# Patient Record
Sex: FEMALE | Race: WHITE
[De-identification: names, ages, dates, MRNs, and addresses within clinical notes are randomized per-mention and may not be internally consistent; named-entity substitution may affect disease eponyms.]

---

## 2017-12-20 ENCOUNTER — HOSPITAL ENCOUNTER (OUTPATIENT)
Dept: HOSPITAL 61 - PCVCIMAG | Age: 82
Discharge: HOME | End: 2017-12-20
Attending: INTERNAL MEDICINE
Payer: MEDICARE

## 2017-12-20 DIAGNOSIS — I08.3: Primary | ICD-10-CM

## 2017-12-20 DIAGNOSIS — I31.3: ICD-10-CM

## 2017-12-20 DIAGNOSIS — Z95.1: ICD-10-CM

## 2017-12-20 DIAGNOSIS — I48.91: ICD-10-CM

## 2017-12-20 DIAGNOSIS — I10: ICD-10-CM

## 2017-12-20 PROCEDURE — 93306 TTE W/DOPPLER COMPLETE: CPT

## 2018-03-19 ENCOUNTER — HOSPITAL ENCOUNTER (OUTPATIENT)
Dept: HOSPITAL 61 - PCVCCLINIC | Age: 83
Discharge: HOME | End: 2018-03-19
Attending: INTERNAL MEDICINE
Payer: MEDICARE

## 2018-03-19 DIAGNOSIS — Z95.0: ICD-10-CM

## 2018-03-19 DIAGNOSIS — E78.5: ICD-10-CM

## 2018-03-19 DIAGNOSIS — I10: Primary | ICD-10-CM

## 2018-03-19 DIAGNOSIS — I48.0: ICD-10-CM

## 2018-03-19 DIAGNOSIS — Z79.899: ICD-10-CM

## 2018-03-19 PROCEDURE — 80061 LIPID PANEL: CPT

## 2018-03-19 PROCEDURE — 93280 PM DEVICE PROGR EVAL DUAL: CPT

## 2018-09-17 ENCOUNTER — HOSPITAL ENCOUNTER (OUTPATIENT)
Dept: HOSPITAL 61 - PCVCCLINIC | Age: 83
Discharge: HOME | End: 2018-09-17
Attending: INTERNAL MEDICINE
Payer: MEDICARE

## 2018-09-17 DIAGNOSIS — K21.9: ICD-10-CM

## 2018-09-17 DIAGNOSIS — Z92.0: ICD-10-CM

## 2018-09-17 DIAGNOSIS — E78.5: ICD-10-CM

## 2018-09-17 DIAGNOSIS — R07.9: ICD-10-CM

## 2018-09-17 DIAGNOSIS — Z95.0: ICD-10-CM

## 2018-09-17 DIAGNOSIS — I10: Primary | ICD-10-CM

## 2018-09-17 DIAGNOSIS — I48.0: ICD-10-CM

## 2018-09-17 DIAGNOSIS — E78.5: Primary | ICD-10-CM

## 2018-09-17 DIAGNOSIS — Z79.899: ICD-10-CM

## 2018-09-17 PROCEDURE — 93279 PRGRMG DEV EVAL PM/LDLS PM: CPT

## 2018-09-17 PROCEDURE — 80061 LIPID PANEL: CPT

## 2018-09-17 PROCEDURE — G0463 HOSPITAL OUTPT CLINIC VISIT: HCPCS

## 2018-09-17 PROCEDURE — 93005 ELECTROCARDIOGRAM TRACING: CPT

## 2018-09-27 ENCOUNTER — HOSPITAL ENCOUNTER (OUTPATIENT)
Dept: HOSPITAL 61 - PCVCIMAG | Age: 83
Discharge: HOME | End: 2018-09-27
Attending: INTERNAL MEDICINE
Payer: MEDICARE

## 2018-09-27 DIAGNOSIS — I48.0: Primary | ICD-10-CM

## 2018-09-27 DIAGNOSIS — R07.9: ICD-10-CM

## 2018-09-27 PROCEDURE — A9500 TC99M SESTAMIBI: HCPCS

## 2018-09-27 PROCEDURE — 93017 CV STRESS TEST TRACING ONLY: CPT

## 2018-09-27 PROCEDURE — 78452 HT MUSCLE IMAGE SPECT MULT: CPT

## 2018-10-02 NOTE — PCVCIMAG
--------------- APPROVED REPORT --------------





Imaging Protocol: Rest Tc-99m/Stress Tc-99m 1 day

Study performed:  09/27/2018 10:16:18



Indication: Chest pain, Paroxysmal Atrial Fibrillation

Patient Location: Out-Patient

Stress Nurse: Estella Russell RN

NM Tech:BROOK MenardMT



Ht: 5 ft 0 in Wt: 125 lbs BSA:  1.53 m2

HR: 90 bpm                      BP: 178/84 mmHg         BMI:  

24.4



Medical History

Medications: Xanax, Carvedilol, Losartan, Pantoprazole, Simvastatin, 

Xarelto

Allergies: No known drug allergies

Cardiac Risk Factors: Age, HTN, Hyperlipidemia, Afib, 

Pacemaker

Previous Cardiac Procedures: PPM

Pretest Chest Pain Characteristics: No chest pain

Exercise History: Physically active

Meds Held (24 hrs): Carvedilol



Resting Data

Rest SPECT myocardial perfusion imaging was performed in supine 

position 45 minutes following the intravenous injection of 11.0 mCi 

of Tc-99m Sestamibi.

Time of rest injection: 0930     Date: 09/27/2018

Administration Route: IV

Administration Site: Right AC



Pharmacologic Stress

Pharmacologic stress test was performed by injecting Regadenoson 0.4 

mg IV push over 10-15 seconds immediately followed by the intravenous 

injection of 33.2 mCi of Tc-99m Sestamibi.

Time of stress injection: 1050     Date: 09/27/2018

Administration Route: IV

Administration Site: Right AC

Gated Stress SPECT was performed 45 minutes after stress 

injection.

The images were gated to evaluate regional wall motion and calculate 

left ventricular ejection fraction. 



Stress Test Details

Stress Test:  Pharmacologic stress testing performed using 0.4 mg of 

regadenoson per 5 mL given IV over 10 seconds.

  Reason for pharmacologic stress test: physical limitation.



HRMax Heart Rate (APMHR): 131 bpm 

Resting HR:            90 bpmTarget HR (85% APMHR): 111 bpm

Max HR Achieved:  80 bpm

% of APMHR:         61

Recovery HR:            82 bpm



BP

Resting BP:  178/84 mmHg



Recovery BP:       172/83 mmHg

ECG

Resting ECG:  Sinus Rhythm, 1st degree AV block

Stress ECG:     Sinus Rhythm, 1st degree AV block

Arrhythmia:    None

Recovery ECG: Sinus Rhythm, 1st degree AV 

block



Clinical

Reason for Termination: Completed protocol

Stress Symptoms: Mild Dyspnea

Exercise duration: 0 min 55 sec

Symptoms resolved during recovery.



Stress ECG Conclusion

ECG: Non-ischemic



Study Quality

Study: Good



Study Data

Post stress, the left ventricular ejection was 74%..

SSS: 0

SRS: 0

SDS: 0

TID = 0.63.



Perfusion

No evidence of stress induced ischemia or prior myocardial 

infarction.



Wall Motion

Normal left ventricular size and function with no regional wall 

motion abnormalities.



Nuclear Conclusion

No evidence of stress induced ischemia or prior myocardial 

infarction.

Normal left ventricular size and function with no regional wall 

motion abnormalities.

Post stress, the left ventricular ejection was 74%. 

No prior study available for comparison.



Interpreted by:  Jermaine Salguero MD

Electronically Approved: 09/27/2018 

20:04:29



&lt;Conclusion&gt;

ECG: Non-ischemic

## 2018-12-21 ENCOUNTER — HOSPITAL ENCOUNTER (OUTPATIENT)
Dept: HOSPITAL 61 - PCVCCLINIC | Age: 83
Discharge: HOME | End: 2018-12-21
Attending: INTERNAL MEDICINE
Payer: MEDICARE

## 2018-12-21 DIAGNOSIS — Z95.0: ICD-10-CM

## 2018-12-21 DIAGNOSIS — Z48.812: Primary | ICD-10-CM

## 2018-12-21 DIAGNOSIS — R00.1: ICD-10-CM

## 2018-12-21 PROCEDURE — 93280 PM DEVICE PROGR EVAL DUAL: CPT
